# Patient Record
Sex: FEMALE | Race: WHITE | URBAN - METROPOLITAN AREA
[De-identification: names, ages, dates, MRNs, and addresses within clinical notes are randomized per-mention and may not be internally consistent; named-entity substitution may affect disease eponyms.]

---

## 2017-07-25 ENCOUNTER — ROUTINE PRENATAL (OUTPATIENT)
Dept: PERINATAL CARE | Age: 22
End: 2017-07-25
Payer: MEDICAID

## 2017-07-25 VITALS
SYSTOLIC BLOOD PRESSURE: 114 MMHG | HEART RATE: 81 BPM | RESPIRATION RATE: 16 BRPM | WEIGHT: 200 LBS | TEMPERATURE: 98.5 F | HEIGHT: 65 IN | BODY MASS INDEX: 33.32 KG/M2 | DIASTOLIC BLOOD PRESSURE: 72 MMHG

## 2017-07-25 DIAGNOSIS — Z3A.29 29 WEEKS GESTATION OF PREGNANCY: ICD-10-CM

## 2017-07-25 DIAGNOSIS — O99.213 OBESITY COMPLICATING PREGNANCY, THIRD TRIMESTER: Primary | ICD-10-CM

## 2017-07-25 PROBLEM — O99.210 OBESITY COMPLICATING PREGNANCY: Status: ACTIVE | Noted: 2017-07-25

## 2017-07-25 PROCEDURE — 76819 FETAL BIOPHYS PROFIL W/O NST: CPT | Performed by: OBSTETRICS & GYNECOLOGY

## 2017-07-25 PROCEDURE — 76811 OB US DETAILED SNGL FETUS: CPT | Performed by: OBSTETRICS & GYNECOLOGY

## 2022-01-03 ENCOUNTER — TELEPHONE (OUTPATIENT)
Dept: BARIATRICS/WEIGHT MGMT | Age: 27
End: 2022-01-03

## 2022-01-03 NOTE — TELEPHONE ENCOUNTER
[facility]    Online Info Session Completed:  on 1/2/22 Verified Insurance Benefit   with [facility]    Patient informed the following:    BMI only 34.3 does not meet criteria.

## 2024-04-19 ENCOUNTER — HOSPITAL ENCOUNTER
Dept: HOSPITAL 101 - LAB | Age: 29
Discharge: HOME | End: 2024-04-19
Payer: COMMERCIAL

## 2024-04-19 DIAGNOSIS — Z52.819: Primary | ICD-10-CM

## 2024-04-19 LAB
ALANINE AMINOTRANSFERASE: 21 U/L (ref 14–59)
ALBUMIN GLOBULIN RATIO: 0.9
ALBUMIN LEVEL: 3.4 G/DL (ref 3.4–5)
ALKALINE PHOSPHATASE: 67 U/L (ref 46–116)
ANION GAP: 16.5
ASPARTATE AMINO TRANSFERASE: 14 U/L (ref 15–37)
BLOOD UREA NITROGEN: 11 MG/DL (ref 7–18)
CALCIUM: 8.7 MG/DL (ref 8.5–10.1)
CARBON DIOXIDE: 24.5 MMOL/L (ref 21–32)
CHLORIDE: 106 MMOL/L (ref 98–107)
ESTIMATED GFR (AFRICAN AMERICA: >60 (ref 60–?)
ESTIMATED GFR (NON-AFRICAN AME: >60 (ref 60–?)
GLOBULIN: 3.8 G/DL
GLUCOSE: 108 MG/DL (ref 74–106)
HEMATOCRIT: 37.3 % (ref 36–48)
HEMOGLOBIN: 11.9 G/DL (ref 12–16)
MCV RBC: 89.2 FL (ref 81–99)
MEAN CORPUSCULAR HEMOGLOBIN: 28.5 PG (ref 26.7–34)
MEAN CORPUSCULAR HGB CONC: 31.9 G/DL (ref 29.9–35.2)
PLATELET COUNT: 291 10^3/UL (ref 150–450)
POTASSIUM: 4 MMOL/L (ref 3.5–5.1)
RED BLOOD COUNT: 4.18 10^6/UL (ref 4.2–5.4)
SODIUM: 143 MMOL/L (ref 136–145)
TOTAL PROTEIN: 7.2 G/DL (ref 6.4–8.2)
WHITE BLOOD COUNT: 9.9 10^3/UL (ref 4–11)

## 2024-04-19 PROCEDURE — 87491 CHLMYD TRACH DNA AMP PROBE: CPT

## 2024-04-19 PROCEDURE — 85027 COMPLETE CBC AUTOMATED: CPT

## 2024-04-19 PROCEDURE — 87591 N.GONORRHOEAE DNA AMP PROB: CPT

## 2024-04-19 PROCEDURE — 85660 RBC SICKLE CELL TEST: CPT

## 2024-04-19 PROCEDURE — 86850 RBC ANTIBODY SCREEN: CPT

## 2024-04-19 PROCEDURE — 80053 COMPREHEN METABOLIC PANEL: CPT

## 2024-04-19 PROCEDURE — 86900 BLOOD TYPING SEROLOGIC ABO: CPT

## 2024-04-19 PROCEDURE — 86901 BLOOD TYPING SEROLOGIC RH(D): CPT

## 2024-04-19 PROCEDURE — 82397 CHEMILUMINESCENT ASSAY: CPT

## 2024-04-19 PROCEDURE — 36415 COLL VENOUS BLD VENIPUNCTURE: CPT

## 2024-05-10 ENCOUNTER — HOSPITAL ENCOUNTER
Dept: HOSPITAL 101 - LAB | Age: 29
Discharge: HOME | End: 2024-05-10
Payer: COMMERCIAL

## 2024-05-10 DIAGNOSIS — Z52.819: Primary | ICD-10-CM

## 2024-05-10 PROCEDURE — 82397 CHEMILUMINESCENT ASSAY: CPT

## 2024-05-10 PROCEDURE — 87491 CHLMYD TRACH DNA AMP PROBE: CPT

## 2024-05-10 PROCEDURE — 36415 COLL VENOUS BLD VENIPUNCTURE: CPT

## 2024-05-10 PROCEDURE — 87591 N.GONORRHOEAE DNA AMP PROB: CPT

## 2024-07-08 ENCOUNTER — HOSPITAL ENCOUNTER
Age: 29
Discharge: HOME | End: 2024-07-08
Payer: SELF-PAY

## 2024-07-08 DIAGNOSIS — Z52.819: Primary | ICD-10-CM

## 2024-07-08 LAB — THYROID STIMULATING HORMONE: 1.69 UIU/ML (ref 0.36–3.74)

## 2024-07-08 PROCEDURE — 83001 ASSAY OF GONADOTROPIN (FSH): CPT

## 2024-07-08 PROCEDURE — 82670 ASSAY OF TOTAL ESTRADIOL: CPT

## 2024-07-08 PROCEDURE — 84702 CHORIONIC GONADOTROPIN TEST: CPT

## 2024-07-08 PROCEDURE — 36415 COLL VENOUS BLD VENIPUNCTURE: CPT

## 2024-07-08 PROCEDURE — 84144 ASSAY OF PROGESTERONE: CPT

## 2024-07-08 PROCEDURE — 83002 ASSAY OF GONADOTROPIN (LH): CPT

## 2024-07-08 PROCEDURE — 84443 ASSAY THYROID STIM HORMONE: CPT

## 2024-07-08 PROCEDURE — 76830 TRANSVAGINAL US NON-OB: CPT

## 2024-07-17 ENCOUNTER — HOSPITAL ENCOUNTER
Dept: HOSPITAL 101 - LAB | Age: 29
Discharge: HOME | End: 2024-07-17
Payer: SELF-PAY

## 2024-07-17 DIAGNOSIS — Z52.819: Primary | ICD-10-CM

## 2024-07-17 PROCEDURE — 76830 TRANSVAGINAL US NON-OB: CPT

## 2024-07-19 ENCOUNTER — HOSPITAL ENCOUNTER
Dept: HOSPITAL 101 - US | Age: 29
Discharge: HOME | End: 2024-07-19
Payer: SELF-PAY

## 2024-07-19 DIAGNOSIS — Z52.819: Primary | ICD-10-CM

## 2024-07-19 PROCEDURE — 76830 TRANSVAGINAL US NON-OB: CPT

## 2024-07-22 ENCOUNTER — HOSPITAL ENCOUNTER
Dept: HOSPITAL 101 - US | Age: 29
Discharge: HOME | End: 2024-07-22
Payer: SELF-PAY

## 2024-07-22 DIAGNOSIS — Z52.819: Primary | ICD-10-CM

## 2024-07-22 PROCEDURE — 76830 TRANSVAGINAL US NON-OB: CPT

## 2024-08-16 ENCOUNTER — HOSPITAL ENCOUNTER
Dept: HOSPITAL 101 - MAMMO | Age: 29
Discharge: HOME | End: 2024-08-16
Payer: SELF-PAY

## 2024-08-16 DIAGNOSIS — N63.20: Primary | ICD-10-CM

## 2024-08-16 DIAGNOSIS — Z12.39: ICD-10-CM

## 2024-08-16 DIAGNOSIS — N63.22: ICD-10-CM

## 2024-08-16 PROCEDURE — G0279 TOMOSYNTHESIS, MAMMO: HCPCS

## 2024-08-16 PROCEDURE — 77066 DX MAMMO INCL CAD BI: CPT

## 2024-08-16 PROCEDURE — 76642 ULTRASOUND BREAST LIMITED: CPT

## 2024-09-30 ENCOUNTER — HOSPITAL ENCOUNTER (OUTPATIENT)
Dept: HOSPITAL 101 - LAB | Age: 29
Discharge: HOME | End: 2024-09-30
Payer: COMMERCIAL

## 2024-09-30 DIAGNOSIS — Z01.419: Primary | ICD-10-CM

## 2024-09-30 PROCEDURE — 87624 HPV HI-RISK TYP POOLED RSLT: CPT

## 2024-09-30 PROCEDURE — 88175 CYTOPATH C/V AUTO FLUID REDO: CPT

## 2024-12-26 ENCOUNTER — HOSPITAL ENCOUNTER
Dept: HOSPITAL 101 - US | Age: 29
Discharge: HOME | End: 2024-12-26
Payer: COMMERCIAL

## 2024-12-26 DIAGNOSIS — N63.22: ICD-10-CM

## 2024-12-26 DIAGNOSIS — R92.8: Primary | ICD-10-CM

## 2024-12-26 PROCEDURE — G0279 TOMOSYNTHESIS, MAMMO: HCPCS

## 2024-12-26 PROCEDURE — 77065 DX MAMMO INCL CAD UNI: CPT

## 2024-12-26 PROCEDURE — 76642 ULTRASOUND BREAST LIMITED: CPT

## 2024-12-30 ENCOUNTER — HOSPITAL ENCOUNTER (EMERGENCY)
Age: 29
Discharge: HOME | End: 2024-12-30
Payer: COMMERCIAL

## 2024-12-30 VITALS
SYSTOLIC BLOOD PRESSURE: 127 MMHG | DIASTOLIC BLOOD PRESSURE: 73 MMHG | OXYGEN SATURATION: 97 % | TEMPERATURE: 98.7 F | HEART RATE: 82 BPM

## 2024-12-30 VITALS — BODY MASS INDEX: 38.3 KG/M2

## 2024-12-30 DIAGNOSIS — N63.20: ICD-10-CM

## 2024-12-30 DIAGNOSIS — N61.0: Primary | ICD-10-CM

## 2024-12-30 LAB
ADD MANUAL DIFF: NO
ALANINE AMINOTRANSFERASE: 13 U/L (ref 14–59)
ALBUMIN GLOBULIN RATIO: 0.9
ALBUMIN LEVEL: 3.6 G/DL (ref 3.4–5)
ALKALINE PHOSPHATASE: 85 U/L (ref 46–116)
ANION GAP: 9.1
ASPARTATE AMINO TRANSFERASE: 11 U/L (ref 15–37)
BLOOD UREA NITROGEN: 5 MG/DL (ref 7–18)
CALCIUM: 9.1 MG/DL (ref 8.5–10.1)
CARBON DIOXIDE: 28.7 MMOL/L (ref 21–32)
CHLORIDE: 105 MMOL/L (ref 98–107)
CO2 BLD-SCNC: 28.7 MMOL/L (ref 21–32)
ESTIMATED GFR (AFRICAN AMERICA: >60
ESTIMATED GFR (NON-AFRICAN AME: >60
GLOBULIN: 4.1 G/DL
GLUCOSE BLD-MCNC: 96 MG/DL (ref 74–106)
HCT VFR BLD CALC: 37.5 % (ref 36–48)
HEMATOCRIT: 37.5 % (ref 36–48)
HEMOGLOBIN: 12.4 G/DL (ref 12–16)
IMMATURE GRANULOCYTES ABS AUTO: 0.03 10^3/UL (ref 0–0.03)
IMMATURE GRANULOCYTES PCT AUTO: 0.2 % (ref 0–0.5)
LYMPHOCYTES  ABSOLUTE AUTO: 2.3 10^3/UL (ref 1.2–3.8)
MCV RBC: 85.8 FL (ref 81–99)
MEAN CORPUSCULAR HEMOGLOBIN: 28.4 PG (ref 26.7–34)
MEAN CORPUSCULAR HGB CONC: 33.1 G/DL (ref 29.9–35.2)
MEAN CORPUSCULAR VOLUME: 85.8 FL (ref 81–99)
PLATELET # BLD: 399 10^3/UL (ref 150–450)
PLATELET COUNT: 399 10^3/UL (ref 150–450)
POTASSIUM SERPLBLD-SCNC: 3.8 MMOL/L (ref 3.5–5.1)
POTASSIUM: 3.8 MMOL/L (ref 3.5–5.1)
RED BLOOD COUNT: 4.37 10^6/UL (ref 4.2–5.4)
SODIUM BLD-SCNC: 139 MMOL/L (ref 136–145)
SODIUM: 139 MMOL/L (ref 136–145)
TOTAL PROTEIN: 7.7 G/DL (ref 6.4–8.2)
WBC # BLD: 12.7 10^3/UL (ref 4–11)
WHITE BLOOD COUNT: 12.7 10^3/UL (ref 4–11)

## 2024-12-30 PROCEDURE — 96375 TX/PRO/DX INJ NEW DRUG ADDON: CPT

## 2024-12-30 PROCEDURE — 36415 COLL VENOUS BLD VENIPUNCTURE: CPT

## 2024-12-30 PROCEDURE — 96365 THER/PROPH/DIAG IV INF INIT: CPT

## 2024-12-30 PROCEDURE — 84703 CHORIONIC GONADOTROPIN ASSAY: CPT

## 2024-12-30 PROCEDURE — 85025 COMPLETE CBC W/AUTO DIFF WBC: CPT

## 2024-12-30 PROCEDURE — 99284 EMERGENCY DEPT VISIT MOD MDM: CPT

## 2024-12-30 PROCEDURE — 80053 COMPREHEN METABOLIC PANEL: CPT

## 2024-12-31 ENCOUNTER — HOSPITAL ENCOUNTER (EMERGENCY)
Age: 29
Discharge: HOME | End: 2024-12-31
Payer: COMMERCIAL

## 2024-12-31 ENCOUNTER — HOSPITAL ENCOUNTER (OUTPATIENT)
Dept: HOSPITAL 101 - HEMC | Age: 29
Discharge: HOME | End: 2024-12-31
Payer: COMMERCIAL

## 2024-12-31 VITALS
SYSTOLIC BLOOD PRESSURE: 129 MMHG | OXYGEN SATURATION: 96 % | HEART RATE: 95 BPM | DIASTOLIC BLOOD PRESSURE: 76 MMHG | TEMPERATURE: 98.78 F

## 2024-12-31 VITALS — BODY MASS INDEX: 38.3 KG/M2

## 2024-12-31 DIAGNOSIS — F17.210: ICD-10-CM

## 2024-12-31 DIAGNOSIS — N63.20: Primary | ICD-10-CM

## 2024-12-31 DIAGNOSIS — N64.4: ICD-10-CM

## 2024-12-31 PROCEDURE — G0463 HOSPITAL OUTPT CLINIC VISIT: HCPCS

## 2024-12-31 PROCEDURE — 99281 EMR DPT VST MAYX REQ PHY/QHP: CPT

## 2025-01-09 ENCOUNTER — HOSPITAL ENCOUNTER (OUTPATIENT)
Dept: HOSPITAL 101 - HEMC | Age: 30
LOS: 22 days | Discharge: HOME | End: 2025-01-31
Payer: COMMERCIAL

## 2025-01-09 DIAGNOSIS — F17.210: ICD-10-CM

## 2025-01-09 DIAGNOSIS — N63.25: Primary | ICD-10-CM

## 2025-01-09 PROCEDURE — G0463 HOSPITAL OUTPT CLINIC VISIT: HCPCS

## 2025-02-28 ENCOUNTER — HOSPITAL ENCOUNTER (EMERGENCY)
Age: 30
Discharge: HOME | End: 2025-02-28
Payer: COMMERCIAL

## 2025-02-28 VITALS
HEART RATE: 84 BPM | SYSTOLIC BLOOD PRESSURE: 118 MMHG | TEMPERATURE: 98.6 F | DIASTOLIC BLOOD PRESSURE: 88 MMHG | OXYGEN SATURATION: 97 %

## 2025-02-28 VITALS — BODY MASS INDEX: 38.9 KG/M2

## 2025-02-28 DIAGNOSIS — S05.02XA: Primary | ICD-10-CM

## 2025-02-28 PROCEDURE — 99283 EMERGENCY DEPT VISIT LOW MDM: CPT

## 2025-05-21 ENCOUNTER — HOSPITAL ENCOUNTER (OUTPATIENT)
Dept: HOSPITAL 101 - PT | Age: 30
LOS: 1 days | Discharge: HOME | End: 2025-05-22
Payer: COMMERCIAL

## 2025-05-21 DIAGNOSIS — N61.22: Primary | ICD-10-CM

## 2025-05-21 PROCEDURE — 97750 PHYSICAL PERFORMANCE TEST: CPT
